# Patient Record
Sex: FEMALE | Race: WHITE | HISPANIC OR LATINO | URBAN - METROPOLITAN AREA
[De-identification: names, ages, dates, MRNs, and addresses within clinical notes are randomized per-mention and may not be internally consistent; named-entity substitution may affect disease eponyms.]

---

## 2018-06-02 ENCOUNTER — EMERGENCY (EMERGENCY)
Facility: HOSPITAL | Age: 58
LOS: 1 days | Discharge: HOME | End: 2018-06-02
Admitting: EMERGENCY MEDICINE
Payer: COMMERCIAL

## 2018-06-02 VITALS
DIASTOLIC BLOOD PRESSURE: 90 MMHG | SYSTOLIC BLOOD PRESSURE: 153 MMHG | WEIGHT: 186.07 LBS | OXYGEN SATURATION: 97 % | TEMPERATURE: 98 F | HEART RATE: 87 BPM | RESPIRATION RATE: 18 BRPM
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
## 2018-06-02 DIAGNOSIS — Z88.1 ALLERGY STATUS TO OTHER ANTIBIOTIC AGENTS STATUS: ICD-10-CM

## 2018-06-02 DIAGNOSIS — S52.502A UNSPECIFIED FRACTURE OF THE LOWER END OF LEFT RADIUS, INITIAL ENCOUNTER FOR CLOSED FRACTURE: ICD-10-CM
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
## 2018-06-02 DIAGNOSIS — M25.532 PAIN IN LEFT WRIST: ICD-10-CM
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
## 2018-06-02 PROCEDURE — 25600 CLTX DST RDL FX/EPHYS SEP WO: CPT | Mod: LT

## 2018-06-02 PROCEDURE — 73130 X-RAY EXAM OF HAND: CPT | Mod: 26,LT

## 2018-06-02 PROCEDURE — 73110 X-RAY EXAM OF WRIST: CPT | Mod: 26,LT

## 2018-06-02 PROCEDURE — 73130 X-RAY EXAM OF HAND: CPT

## 2018-06-02 PROCEDURE — 99284 EMERGENCY DEPT VISIT MOD MDM: CPT | Mod: 25

## 2018-06-02 PROCEDURE — 99284 EMERGENCY DEPT VISIT MOD MDM: CPT

## 2018-06-02 PROCEDURE — 73110 X-RAY EXAM OF WRIST: CPT

## 2018-06-02 RX ORDER — OXYCODONE AND ACETAMINOPHEN 5; 325 MG/1; MG/1
1 TABLET ORAL ONCE
Qty: 0 | Refills: 0 | Status: DISCONTINUED | OUTPATIENT
Start: 2018-06-02 | End: 2018-06-02

## 2018-06-02 RX ADMIN — OXYCODONE AND ACETAMINOPHEN 1 TABLET(S): 5; 325 TABLET ORAL at 16:55

## 2018-06-02 NOTE — CONSULT NOTE ADULT - SUBJECTIVE AND OBJECTIVE BOX
Orthopaedic Surgery Consult Note    For Surgeon: Jhonatan    CC:  Patient is a 58y old  Female who presents with a chief complaint of wrist pain and swelling  HPI:  Patient is a 58 year old lady, RHD, who tripped and broke her fall with L hand and since then L wrist has been swollen and hurting.    Allergies    tetracycline (Hives)    Intolerances      PAST MEDICAL & SURGICAL HISTORY:  Breast cancer    MEDICATIONS  (STANDING):    MEDICATIONS  (PRN):      Vital Signs Last 24 Hrs  T(C): 36.9 (02 Jun 2018 16:24), Max: 36.9 (02 Jun 2018 16:24)  T(F): 98.5 (02 Jun 2018 16:24), Max: 98.5 (02 Jun 2018 16:24)  HR: 87 (02 Jun 2018 16:24) (87 - 87)  BP: 153/90 (02 Jun 2018 16:24) (153/90 - 153/90)  BP(mean): --  RR: 18 (02 Jun 2018 16:24) (18 - 18)  SpO2: 97% (02 Jun 2018 16:24) (97% - 97%)    Physical Exam:    WWP, BCR  R/U  Sensory: U/M/A/R  Motor: AIN/PIN/U/R 5/5  ROM: Limited painful ROM at the L wrist  Deformity: L wrist swelling            Imaging: Colle's fracture L distal radius    Good reduction    A/P: 58yFemale with L distal radius fracture, reduced and splinted under fluoro in the ED. Sugar tong splint was placed. Hematoma block was used. Follow up with Dr. Israel    -Discussed with Dr. Israel

## 2018-06-02 NOTE — ED PROVIDER NOTE - PHYSICAL EXAMINATION
+ swelling and left wrist radial aspect and dorsum of left hand. + diffuse tenderness. limited ROM due to pain. no bruising or redness. no deformity. distal NV. elbow and shoulder non tender

## 2018-06-02 NOTE — ED PROVIDER NOTE - OBJECTIVE STATEMENT
57 y/o female with hx of breast ca PMHx c/o left wrist pain s/p fall x 2 hrs. pt states tripped and fell at home and landed on outstretched hands. no head injury, loc, neck or back pain. no numbness or tingling. no further complaints.

## 2018-06-02 NOTE — ED ADULT NURSE NOTE - OBJECTIVE STATEMENT
Pt presents to ED c/o L wrist pain s/p mechanical fall today, pt did not have LOC or hit head at time of incident. Pt reports 10/10 pain to L wrist worse with movement, took 400mg motrin PO PTA. Pt denies numbness/tingling, sensation in tact to L fingers, L radial pulse strong, cap refill less than 2 seconds to L hand. Pt presents in NAD speaking full sentences ambulatory through triage. Obvious deformity noted to L wrist.

## 2018-06-02 NOTE — ED PROVIDER NOTE - MEDICAL DECISION MAKING DETAILS
+ left wrist pain s/p fall. neurovascular intact. pt well appearing. VSS. + fx on x-ray. pt evaluated and splinted by ortho in ED. f/u as outpt

## 2018-06-04 ENCOUNTER — APPOINTMENT (OUTPATIENT)
Dept: ORTHOPEDIC SURGERY | Facility: CLINIC | Age: 58
End: 2018-06-04
Payer: COMMERCIAL

## 2018-06-04 ENCOUNTER — EMERGENCY (EMERGENCY)
Facility: HOSPITAL | Age: 58
LOS: 1 days | Discharge: HOME | End: 2018-06-04
Attending: EMERGENCY MEDICINE
Payer: COMMERCIAL

## 2018-06-04 VITALS
TEMPERATURE: 97 F | SYSTOLIC BLOOD PRESSURE: 145 MMHG | HEART RATE: 76 BPM | OXYGEN SATURATION: 98 % | RESPIRATION RATE: 18 BRPM | DIASTOLIC BLOOD PRESSURE: 74 MMHG

## 2018-06-04 VITALS
WEIGHT: 184.97 LBS | DIASTOLIC BLOOD PRESSURE: 84 MMHG | OXYGEN SATURATION: 98 % | RESPIRATION RATE: 18 BRPM | HEART RATE: 69 BPM | SYSTOLIC BLOOD PRESSURE: 152 MMHG | TEMPERATURE: 98 F

## 2018-06-04 VITALS
SYSTOLIC BLOOD PRESSURE: 160 MMHG | DIASTOLIC BLOOD PRESSURE: 78 MMHG | WEIGHT: 186 LBS | HEIGHT: 66 IN | BODY MASS INDEX: 29.89 KG/M2 | HEART RATE: 71 BPM

## 2018-06-04 DIAGNOSIS — Z78.9 OTHER SPECIFIED HEALTH STATUS: ICD-10-CM

## 2018-06-04 DIAGNOSIS — Z82.61 FAMILY HISTORY OF ARTHRITIS: ICD-10-CM

## 2018-06-04 DIAGNOSIS — Z80.9 FAMILY HISTORY OF MALIGNANT NEOPLASM, UNSPECIFIED: ICD-10-CM

## 2018-06-04 DIAGNOSIS — Z82.62 FAMILY HISTORY OF OSTEOPOROSIS: ICD-10-CM

## 2018-06-04 PROBLEM — Z00.00 ENCOUNTER FOR PREVENTIVE HEALTH EXAMINATION: Noted: 2018-06-04

## 2018-06-04 PROCEDURE — 29125 APPL SHORT ARM SPLINT STATIC: CPT | Mod: LT

## 2018-06-04 PROCEDURE — 99283 EMERGENCY DEPT VISIT LOW MDM: CPT | Mod: 25

## 2018-06-04 PROCEDURE — 73110 X-RAY EXAM OF WRIST: CPT | Mod: 26,LT

## 2018-06-04 PROCEDURE — 99203 OFFICE O/P NEW LOW 30 MIN: CPT

## 2018-06-04 PROCEDURE — 29125 APPL SHORT ARM SPLINT STATIC: CPT

## 2018-06-04 PROCEDURE — 99284 EMERGENCY DEPT VISIT MOD MDM: CPT | Mod: 25

## 2018-06-04 PROCEDURE — 73110 X-RAY EXAM OF WRIST: CPT

## 2018-06-04 RX ORDER — EXEMESTANE 25 MG/1
TABLET, FILM COATED ORAL
Refills: 0 | Status: ACTIVE | COMMUNITY

## 2018-06-04 RX ORDER — ACETAMINOPHEN 500 MG
650 TABLET ORAL ONCE
Qty: 0 | Refills: 0 | Status: COMPLETED | OUTPATIENT
Start: 2018-06-04 | End: 2018-06-04

## 2018-06-04 RX ADMIN — Medication 650 MILLIGRAM(S): at 14:42

## 2018-06-04 NOTE — ED PROVIDER NOTE - OBJECTIVE STATEMENT
58yoF with right upper extremity fracture (distal radius) sp casting pw worsening pain and swelling in the finger. pain started 2 days ago, worsened last night, severe, throbbing, radiating upp arm and to fingers, associated with some swelling in fingers and feeling like that cast is too tight. denies any other injuries, fevers or other issues.

## 2018-06-04 NOTE — ED ADULT TRIAGE NOTE - CHIEF COMPLAINT QUOTE
c/o increased pain/ Cannot nia pain LUE /fx LUE with cast Able to move fingers, Min swelling Pos sensation Cap refill wnl

## 2018-06-04 NOTE — ED PROVIDER NOTE - PHYSICAL EXAMINATION
LEFT arm with echymosis and swelling over distal radius/ulna and echymosis over 3rd,4th,5th digit with underlying edema.   full range of motion of finers of left and right.   full strength and sensation of finger on left and right hand.   pulses 2+ in both upper extremities.

## 2018-06-04 NOTE — ED PROVIDER NOTE - ATTENDING CONTRIBUTION TO CARE
LUE fracture 2 days ago, splinted at [facility] presenting with worsening pain in L wrist/forearm also reporting some color change in fingers today.  No new injuries, no loss of sensation.  On exam patient LUE in sugartong splint, some slight blue discoloration to fingers but normal cap refill/sensation.  Splint taken down in Emergency Department with immediate resolution of symptoms, new splint applied, plain films obtained with no reduction indicated at this time, already has follow up appointment with orthopedics.

## 2018-06-04 NOTE — ED ADULT NURSE NOTE - OBJECTIVE STATEMENT
59 y/o female a+ox3, no pmhx, coming from home for left arm pain s/p cast application for distal raduis fx. Pt reports onset of pain with limited mobility of left hand and arm after cast application for a distal radius fx from a mechanical fall; pt states the cast "feels really tight"; denies fever or chills, numbness or tingling, chest pain or discomfort, headache, feeling lightheaded, dizziness, difficulty breathing, abdominal pain, n/v/d. Pt left in position of comfort, family at bedside. will reassess

## 2018-06-04 NOTE — ED PROVIDER NOTE - PLAN OF CARE
1) Follow up with your doctor on your scheduled appointment.   2) Return to the ER immediately for new or worsening symptoms including uncontrolled pain, vomiting or other issues.   3) Take ibuprofen 600mg every 6 hours as needed for pain.   4) take Benadryl 50mg every 6 hours as needed for itchiness. this may make you sleepy.

## 2018-06-04 NOTE — ED PROVIDER NOTE - CARE PLAN
Principal Discharge DX:	Distal radius fracture, left  Assessment and plan of treatment:	1) Follow up with your doctor on your scheduled appointment.   2) Return to the ER immediately for new or worsening symptoms including uncontrolled pain, vomiting or other issues.   3) Take ibuprofen 600mg every 6 hours as needed for pain.   4) take Benadryl 50mg every 6 hours as needed for itchiness. this may make you sleepy.

## 2018-06-04 NOTE — ED PROVIDER NOTE - MEDICAL DECISION MAKING DETAILS
58yoF pw left arm pain after splinting. splint too tight, will remove, re splint and xray, then dc. patient has appointment on wednesday with ortho.

## 2018-06-05 ENCOUNTER — APPOINTMENT (OUTPATIENT)
Dept: ORTHOPEDIC SURGERY | Facility: CLINIC | Age: 58
End: 2018-06-05

## 2018-06-05 PROBLEM — Z00.00 ENCOUNTER FOR PREVENTIVE HEALTH EXAMINATION: Status: ACTIVE | Noted: 2018-06-05

## 2018-06-06 ENCOUNTER — NON-APPOINTMENT (OUTPATIENT)
Age: 58
End: 2018-06-06

## 2018-06-06 ENCOUNTER — APPOINTMENT (OUTPATIENT)
Dept: ORTHOPEDIC SURGERY | Facility: CLINIC | Age: 58
End: 2018-06-06
Payer: COMMERCIAL

## 2018-06-06 VITALS
BODY MASS INDEX: 29.89 KG/M2 | DIASTOLIC BLOOD PRESSURE: 73 MMHG | WEIGHT: 186 LBS | HEIGHT: 66 IN | SYSTOLIC BLOOD PRESSURE: 137 MMHG | HEART RATE: 74 BPM

## 2018-06-06 DIAGNOSIS — Z85.9 PERSONAL HISTORY OF MALIGNANT NEOPLASM, UNSPECIFIED: ICD-10-CM

## 2018-06-06 PROCEDURE — 73110 X-RAY EXAM OF WRIST: CPT | Mod: LT

## 2018-06-06 PROCEDURE — 99213 OFFICE O/P EST LOW 20 MIN: CPT | Mod: 24

## 2018-06-06 PROCEDURE — 29125 APPL SHORT ARM SPLINT STATIC: CPT | Mod: LT

## 2018-06-06 RX ORDER — BUPROPION HYDROCHLORIDE 75 MG/1
TABLET, FILM COATED ORAL
Refills: 0 | Status: ACTIVE | COMMUNITY

## 2018-06-06 RX ORDER — MIRTAZAPINE 15 MG/1
15 TABLET, FILM COATED ORAL
Qty: 90 | Refills: 0 | Status: ACTIVE | COMMUNITY
Start: 2018-04-23

## 2018-06-08 ENCOUNTER — TRANSCRIPTION ENCOUNTER (OUTPATIENT)
Age: 58
End: 2018-06-08

## 2018-06-08 ENCOUNTER — APPOINTMENT (OUTPATIENT)
Dept: ORTHOPEDIC SURGERY | Facility: CLINIC | Age: 58
End: 2018-06-08

## 2018-06-15 ENCOUNTER — APPOINTMENT (OUTPATIENT)
Dept: ORTHOPEDIC SURGERY | Facility: CLINIC | Age: 58
End: 2018-06-15
Payer: COMMERCIAL

## 2018-06-15 PROCEDURE — A4590: CPT

## 2018-06-15 PROCEDURE — 29075 APPL CST ELBW FNGR SHORT ARM: CPT | Mod: 58,LT

## 2018-06-15 PROCEDURE — 73110 X-RAY EXAM OF WRIST: CPT | Mod: LT

## 2018-06-15 PROCEDURE — 99214 OFFICE O/P EST MOD 30 MIN: CPT | Mod: 25

## 2018-06-15 RX ORDER — OXYCODONE HYDROCHLORIDE 30 MG/1
TABLET ORAL
Refills: 0 | Status: DISCONTINUED | COMMUNITY
End: 2018-06-15

## 2018-06-15 RX ORDER — FENOFIBRATE 160 MG/1
160 TABLET ORAL
Qty: 90 | Refills: 0 | Status: DISCONTINUED | COMMUNITY
Start: 2018-05-11 | End: 2018-06-15

## 2018-06-15 RX ORDER — ERGOCALCIFEROL 1.25 MG/1
1.25 MG CAPSULE, LIQUID FILLED ORAL
Qty: 13 | Refills: 0 | Status: DISCONTINUED | COMMUNITY
Start: 2018-05-11 | End: 2018-06-15

## 2018-06-15 RX ORDER — AZITHROMYCIN 250 MG/1
250 TABLET, FILM COATED ORAL
Qty: 6 | Refills: 0 | Status: DISCONTINUED | COMMUNITY
Start: 2018-01-31 | End: 2018-06-15

## 2018-06-15 RX ORDER — VENLAFAXINE HYDROCHLORIDE 37.5 MG/1
37.5 CAPSULE, EXTENDED RELEASE ORAL
Qty: 180 | Refills: 0 | Status: DISCONTINUED | COMMUNITY
Start: 2017-11-08 | End: 2018-06-15

## 2018-06-15 RX ORDER — OXYCODONE AND ACETAMINOPHEN 5; 325 MG/1; MG/1
5-325 TABLET ORAL EVERY 6 HOURS
Qty: 12 | Refills: 0 | Status: DISCONTINUED | COMMUNITY
Start: 2018-06-02 | End: 2018-06-15

## 2018-06-15 RX ORDER — RIFAXIMIN 550 MG/1
550 TABLET ORAL
Qty: 270 | Refills: 0 | Status: DISCONTINUED | COMMUNITY
Start: 2017-12-18 | End: 2018-06-15

## 2018-06-15 RX ORDER — FERROUS SULFATE 325(65) MG
TABLET ORAL
Refills: 0 | Status: DISCONTINUED | COMMUNITY
End: 2018-06-15

## 2018-06-15 RX ORDER — CHROMIUM 200 MCG
TABLET ORAL
Refills: 0 | Status: DISCONTINUED | COMMUNITY
End: 2018-06-15

## 2018-06-15 RX ORDER — ESCITALOPRAM OXALATE 20 MG/1
20 TABLET ORAL
Qty: 90 | Refills: 0 | Status: DISCONTINUED | COMMUNITY
Start: 2017-11-21 | End: 2018-06-15

## 2018-06-29 ENCOUNTER — APPOINTMENT (OUTPATIENT)
Dept: ORTHOPEDIC SURGERY | Facility: CLINIC | Age: 58
End: 2018-06-29
Payer: COMMERCIAL

## 2018-06-29 VITALS — HEIGHT: 66 IN | WEIGHT: 186 LBS | BODY MASS INDEX: 29.89 KG/M2

## 2018-06-29 PROCEDURE — 99214 OFFICE O/P EST MOD 30 MIN: CPT

## 2018-06-29 PROCEDURE — 73110 X-RAY EXAM OF WRIST: CPT | Mod: LT

## 2018-07-11 ENCOUNTER — APPOINTMENT (OUTPATIENT)
Dept: ORTHOPEDIC SURGERY | Facility: CLINIC | Age: 58
End: 2018-07-11
Payer: COMMERCIAL

## 2018-07-11 DIAGNOSIS — S52.502D UNSPECIFIED FRACTURE OF THE LOWER END OF LEFT RADIUS, SUBSEQUENT ENCOUNTER FOR CLOSED FRACTURE WITH ROUTINE HEALING: ICD-10-CM

## 2018-07-11 PROCEDURE — 99214 OFFICE O/P EST MOD 30 MIN: CPT

## 2018-07-11 PROCEDURE — 73110 X-RAY EXAM OF WRIST: CPT | Mod: LT

## 2018-08-03 PROBLEM — C50.919 MALIGNANT NEOPLASM OF UNSPECIFIED SITE OF UNSPECIFIED FEMALE BREAST: Chronic | Status: ACTIVE | Noted: 2018-06-02

## 2018-08-15 ENCOUNTER — APPOINTMENT (OUTPATIENT)
Dept: ORTHOPEDIC SURGERY | Facility: CLINIC | Age: 58
End: 2018-08-15
Payer: COMMERCIAL

## 2018-08-20 PROBLEM — S52.572S OTHER CLOSED INTRA-ARTICULAR FRACTURE OF DISTAL END OF LEFT RADIUS, SEQUELA: Status: ACTIVE | Noted: 2018-06-06

## 2018-08-22 ENCOUNTER — APPOINTMENT (OUTPATIENT)
Dept: ORTHOPEDIC SURGERY | Facility: CLINIC | Age: 58
End: 2018-08-22
Payer: COMMERCIAL

## 2018-08-22 DIAGNOSIS — S52.572S OTHER INTRAARTICULAR FRACTURE OF LOWER END OF LEFT RADIUS, SEQUELA: ICD-10-CM

## 2018-08-22 PROCEDURE — 73110 X-RAY EXAM OF WRIST: CPT | Mod: LT

## 2018-08-22 PROCEDURE — 99214 OFFICE O/P EST MOD 30 MIN: CPT

## 2024-08-06 NOTE — CONSULT NOTE ADULT - ATTENDING COMMENTS
Subjective  CC: Patient presents to follow for annual physical.    HPI: The patient presents today for annual physical.  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  She had a small polyp removed at the splenic flexure, and pathology will be obtained from this.  She was complaining of bilateral upper extremity neuropathic symptoms at her last office visit last year, but these have resolved.  The patient's weight has remained stable.  She is up-to-date on cervical and breast cancer screenings.    ROS:  Review of Systems   Constitutional: Negative.    HENT: Negative.     Eyes: Negative.    Respiratory: Negative.     Cardiovascular: Negative.    Gastrointestinal: Negative.    Genitourinary: Negative.    Musculoskeletal: Negative.    Skin:         SCC to forehead   Neurological: Negative.    Psychiatric/Behavioral: Negative.          No current outpatient medications on file.   No Known Allergies     PMH:  Past Medical History:   Diagnosis Date    Degenerative disc disease, lumbar         Objective  Vitals:    08/06/24 0846   BP: 108/60   Pulse: 84   Temp: 97.7 °F (36.5 °C)   TempSrc: Temporal   SpO2: 97%   Weight: 87.5 kg (193 lb)   Height: 1.664 m (5' 5.5\")      Exam:  Const: Appears healthy, well developed and well nourished. Appears to weigh within normal range.  Eyes: EOMI in both eyes. PERRL.  ENMT: External ears WNL. Tympanic membranes are intact. Septum is in the midline. Posterior  pharynx shows no exudate, irritation or redness.  Neck: Supple and symmetric. Palpation reveals no adenopathy. No masses appreciated. Thyroid  exhibits no nodule or thyromegaly. No JVD.  Resp: Respirations are unlabored. Respiration rate is normal. Auscultate good airflow. No rales,  rhonchi or wheezes appreciated over the lungs bilaterally.  Abdomen: BS x 4 quadrants. Abdomen soft, round, nontender. No organomegaly noted.  CV: Rhythm is regular. S1 is normal. 
Notified of consult by resident  XR reviewed  Will follow as OPT

## 2025-04-04 NOTE — ED ADULT NURSE NOTE - CHPI ED SYMPTOMS NEG
Patient updated on Dr. Cardenas's recommendations. Transferred to [facility] to schedule an appointment to discuss BC.    no fever/no stiffness/no tingling/no numbness

## 2025-09-11 ENCOUNTER — APPOINTMENT (OUTPATIENT)
Facility: CLINIC | Age: 65
End: 2025-09-11